# Patient Record
Sex: FEMALE | Race: WHITE | NOT HISPANIC OR LATINO | Employment: FULL TIME | URBAN - METROPOLITAN AREA
[De-identification: names, ages, dates, MRNs, and addresses within clinical notes are randomized per-mention and may not be internally consistent; named-entity substitution may affect disease eponyms.]

---

## 2017-12-12 ENCOUNTER — HOSPITAL ENCOUNTER (EMERGENCY)
Facility: OTHER | Age: 27
Discharge: HOME | End: 2017-12-12
Attending: EMERGENCY MEDICINE
Payer: COMMERCIAL

## 2017-12-12 VITALS
WEIGHT: 145 LBS | HEART RATE: 113 BPM | BODY MASS INDEX: 21.48 KG/M2 | DIASTOLIC BLOOD PRESSURE: 79 MMHG | SYSTOLIC BLOOD PRESSURE: 143 MMHG | HEIGHT: 69 IN | TEMPERATURE: 98 F | OXYGEN SATURATION: 100 % | RESPIRATION RATE: 17 BRPM

## 2017-12-12 DIAGNOSIS — J02.9 PHARYNGITIS, UNSPECIFIED ETIOLOGY: Primary | ICD-10-CM

## 2017-12-12 LAB
B-HCG UR QL: NEGATIVE
CTP QC/QA: YES

## 2017-12-12 PROCEDURE — 63600175 PHARM REV CODE 636 W HCPCS: Performed by: EMERGENCY MEDICINE

## 2017-12-12 PROCEDURE — 99283 EMERGENCY DEPT VISIT LOW MDM: CPT | Mod: 25

## 2017-12-12 PROCEDURE — 96372 THER/PROPH/DIAG INJ SC/IM: CPT

## 2017-12-12 PROCEDURE — 81025 URINE PREGNANCY TEST: CPT | Performed by: EMERGENCY MEDICINE

## 2017-12-12 RX ORDER — DEXAMETHASONE SODIUM PHOSPHATE 4 MG/ML
8 INJECTION, SOLUTION INTRA-ARTICULAR; INTRALESIONAL; INTRAMUSCULAR; INTRAVENOUS; SOFT TISSUE
Status: COMPLETED | OUTPATIENT
Start: 2017-12-12 | End: 2017-12-12

## 2017-12-12 RX ORDER — AZITHROMYCIN 250 MG/1
250 TABLET, FILM COATED ORAL DAILY
Qty: 6 TABLET | Refills: 0 | Status: SHIPPED | OUTPATIENT
Start: 2017-12-12

## 2017-12-12 RX ADMIN — DEXAMETHASONE SODIUM PHOSPHATE 8 MG: 4 INJECTION, SOLUTION INTRAMUSCULAR; INTRAVENOUS at 05:12

## 2017-12-12 NOTE — ED TRIAGE NOTES
Pt reports sore throat for 1 1/2 with last fever last Thursday. Reports throat is not getting any better. Pt able to still eat and drink but painful. Pt states she has ulcers in mouth. Pt reports nausea and fatigue.

## 2017-12-12 NOTE — ED PROVIDER NOTES
Encounter Date: 2017    SCRIBE #1 NOTE: I, Magy Sahu , am scribing for, and in the presence of, Dr. Anne.       History     Chief Complaint   Patient presents with    Sore Throat     x 1 week, seen at [facility] and diagnosed with strep, started on 10-day amoxicillin regimen; tested for mono and flu at urgent care on wednesday given steroid shot with some relief from sore throat; pt reports swelling and tenderness to lymph nodes in neck have increased x 2 days      Time seen by provider: 5:01 PM    This is a 27 y.o. female who presents with complaint of sore throat that began ten days ago. The patient was diagnosed with strep throat and started on a 10-day course of amoxicillan (BID) when seen in [facility] clinic. She has five doses of antibiotics left. The patient received a steroid injection and negative flu swab result when seen in Urgent Care six days ago. The patient reports fever (resolved), sinus pressure, and ulcers that are located in the mouth, but denies congestion, rhinorrhea, ear pain, nausea, vomiting, abdominal pain, cough, SOB, or myalgias. She has not noticed any improvement after use of antibiotics, but experienced a little relief with use of mouth wash for ulcers.       The history is provided by the patient.     Review of patient's allergies indicates:  No Known Allergies  No past medical history on file.  No past surgical history on file.  Family History   Problem Relation Age of Onset    Breast cancer Neg Hx     Cancer Neg Hx     Colon cancer Neg Hx     Diabetes Neg Hx     Eclampsia Neg Hx     Hypertension Neg Hx     Miscarriages / Stillbirths Neg Hx     Ovarian cancer Neg Hx      labor Neg Hx     Stroke Neg Hx      Social History   Substance Use Topics    Smoking status: Never Smoker    Smokeless tobacco: Not on file    Alcohol use Yes     Review of Systems   Constitutional: Positive for fever (resolved). Negative for activity change, appetite change, chills and  diaphoresis.   HENT: Positive for sinus pressure and sore throat. Negative for congestion, ear pain, rhinorrhea and trouble swallowing.         Positive for ulcers.   Eyes: Negative for photophobia and visual disturbance.   Respiratory: Negative for cough, chest tightness and shortness of breath.    Cardiovascular: Negative for chest pain.   Gastrointestinal: Negative for abdominal pain, nausea and vomiting.   Endocrine: Negative for polydipsia and polyuria.   Genitourinary: Negative for difficulty urinating and flank pain.   Musculoskeletal: Negative for back pain, myalgias and neck pain.   Skin: Negative for rash.   Neurological: Negative for weakness and headaches.   Psychiatric/Behavioral: Negative for confusion.       Physical Exam     Initial Vitals [12/12/17 1649]   BP Pulse Resp Temp SpO2   134/88 103 16 97.4 °F (36.3 °C) 100 %      MAP       103.33         Physical Exam    Nursing note and vitals reviewed.  Constitutional: She appears well-developed and well-nourished. She is not diaphoretic. No distress.   HENT:   Head: Normocephalic and atraumatic.   Right Ear: External ear normal.   Left Ear: External ear normal. Tympanic membrane is not erythematous.  No middle ear effusion.   Bilateral tonsillar edema and erythema (left greater than right). No sublingual elevation. No soft palate swelling. Right tympanic membrane: Obscured by cerumen.    Eyes: EOM are normal. Pupils are equal, round, and reactive to light. Right eye exhibits no discharge. Left eye exhibits no discharge.   Neck: Normal range of motion.   Bilateral submental lymphadenopathy.   Cardiovascular: Normal rate, regular rhythm and normal heart sounds. Exam reveals no gallop and no friction rub.    No murmur heard.  Pulmonary/Chest: Breath sounds normal. No stridor. No respiratory distress. She has no wheezes. She has no rhonchi. She has no rales.   Abdominal: Soft. There is no tenderness. There is no rebound and no guarding.   Musculoskeletal:  Normal range of motion. She exhibits no edema or tenderness.   Lymphadenopathy:     She has cervical adenopathy (bilateral).   Neurological: She is alert and oriented to person, place, and time.   Skin: Skin is warm and dry. No rash and no abscess noted. No erythema. No pallor.   Psychiatric: She has a normal mood and affect. Her behavior is normal. Judgment and thought content normal.         ED Course   Procedures  Labs Reviewed   POCT URINE PREGNANCY             Medical Decision Making:   Clinical Tests:   Lab Tests: Ordered and Reviewed  ED Management:  Well-appearing patient presents with persistent pharyngitis greater than 1 week.  She has been taking amoxicillin, and received a steroid shot.  Initial improvements steroid shot, but feels symptoms have gotten worse.  No difficulties breathing.  Painful swallowing, no difficulty tolerating solids or liquids.  Is also concerned about increasing lymphadenopathy.  Recently noticed also tender mouth.  Also to be most consistent with a viral stomatitis.  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  She does have tonsillar edema, left greater than right, it is not taut, and has no appearance of PTA.  There is no uvular deviation.  She is having no stridor.  I've given her another steroid shot, switch her antibiotics, and encouraged her to stay hydrated and follow-up with primary care.  She is return here with any worsening of her symptoms.    I did have an extensive talk regarding signs to return for and need for follow up. Patient expressed understanding and will monitor symptoms closely and follow-up as needed.    CLAYTON Anne M.D.  12/12/2017  6:09 PM              Scribe Attestation:   Scribe #1: I performed the above scribed service and the documentation accurately describes the services I performed. I attest to the accuracy of the note.    Attending Attestation:           Physician Attestation for Scribe:  Physician  Attestation Statement for Scribe #1: I, Dr. Anne, reviewed documentation, as scribed by Magy Sahu  in my presence, and it is both accurate and complete.                 ED Course      Clinical Impression:     1. Pharyngitis, unspecified etiology                               Gomez Anne MD  12/12/17 8901

## 2018-03-04 ENCOUNTER — OFFICE VISIT (OUTPATIENT)
Dept: URGENT CARE | Facility: CLINIC | Age: 28
End: 2018-03-04
Payer: COMMERCIAL

## 2018-03-04 VITALS
BODY MASS INDEX: 20.29 KG/M2 | HEART RATE: 100 BPM | RESPIRATION RATE: 20 BRPM | TEMPERATURE: 99 F | SYSTOLIC BLOOD PRESSURE: 139 MMHG | DIASTOLIC BLOOD PRESSURE: 92 MMHG | OXYGEN SATURATION: 99 % | WEIGHT: 137 LBS | HEIGHT: 69 IN

## 2018-03-04 DIAGNOSIS — K12.0 CANKER SORE: Primary | ICD-10-CM

## 2018-03-04 PROCEDURE — 99203 OFFICE O/P NEW LOW 30 MIN: CPT | Mod: S$GLB,,, | Performed by: NURSE PRACTITIONER

## 2018-03-04 RX ORDER — NORGESTREL AND ETHINYL ESTRADIOL 0.3-0.03MG
KIT ORAL
COMMUNITY
Start: 2018-01-11

## 2018-03-04 RX ORDER — TRIAMCINOLONE ACETONIDE 1 MG/G
PASTE DENTAL 2 TIMES DAILY
Qty: 5 G | Refills: 2 | Status: SHIPPED | OUTPATIENT
Start: 2018-03-04 | End: 2018-04-03

## 2018-03-04 NOTE — PATIENT INSTRUCTIONS
Stay hydrated with increased water intake.  Avoid acidic foods.  Magic Mouthwash as directed.  Kenalog cream as directed.  Follow up with PCP.  Canker Sore    A canker sore (also called an aphthous ulcer) is a painful sore on the lining of the mouth. It is most painful during the first few days, and it lasts about 7 to 14 days before going away.  Causes  Canker sores are not cold sores or fever blisters. They are not contagious, so they are not spread by contact. The exact cause of canker sores is not clear, but there are a number of things that can trigger them in different people.  · Mild injury, such as biting the inside of the mouth, lip, or cheek, or dental procedures  · Stress  · Poor diet, or lack of certain nutrients, including B vitamins and iron  · Foods that can irritate the mouth, including tomatoes, citrus fruits, and some nuts (foods that are acidic or contain bitter substances called tannins)  · Irritating chemicals, such as those in some toothpastes and mouthwashes  · Certain chronic illnesses  Symptoms  Canker sores are found on the lining of the mouth. They can be inside the cheeks or lips, on the roof of the mouth, at the base of the gums, on the tongue, or in the back of the throat. Canker sores typically have these characteristics:  · Small, flat (not raised) sores  · Can be white or yellowish bumps that are red around the edges or have a red halo  · Usually small in size, roundish, and in groups  · Accompanied by pain or burning  Canker sores do not leave a scar. But they usually come back.  Home care  The goals of canker sore treatment are to decrease the pain, speed healing, and prevent recurrence. No single treatment works for everyone. Try a number of techniques to see what works best.  General care  · You may find that soft, easy-to-chew foods cause less pain. Use a straw to direct liquids away from the sore.  · Use a soft-bristle toothbrush, and brush your teeth gently.  · Avoid acidic,  salty, or spicy foods.  · Avoid injuring the inside of your mouth, or scraping your existing canker sores, by avoiding crusty and crunchy foods like french bread and chips.  Medicines  You can try over-the-counter medicines that cover the sores and numb them. This protects the sores while they heal and helps reduce pain.  Homemade rinses and solutions  You can use these solutions as mouth rinses. Spit them out after using them. You can also dab them on the sores. You can repeat these treatments as often as needed.  · Rinse your mouth with saltwater.  · Mix equal amounts of hydrogen peroxide and water. You can use it as a mouthwash or dab it on spots with a cotton swab. You can also add sodium bicarbonate to this to make a paste, and then dab it on spots.  Follow-up care  Follow up with your healthcare provider, or as advised.  · If a culture was done, you will be notified if the treatment needs to be changed. You can call as directed for the results.  Call 911  Contact emergency services if any of these occur:  · Trouble breathing  · Inability to swallow  · Extreme drowsiness or trouble awakening  · Fainting or loss of consciousness  · Rapid heart rate  · Seizure  · Stiff neck  When to seek medical advice  Call your healthcare provider right away if any of these occur:  · You have a fever of 100.4°F (38°C) or higher.  · You are pregnant.  · You just had surgery or another medical procedure, or you were just discharged from the hospital.  · You are unable to eat or swallow due to pain.  Date Last Reviewed: 7/30/2015  © 1824-2544 [employer]. [address] [place] [address], [place] [address]. All rights reserved. This information is not intended as a substitute for professional medical care. Always follow your healthcare professional's instructions.

## 2018-03-04 NOTE — PROGRESS NOTES
"Subjective:       Patient ID: Christina Willson is a 27 y.o. female.    Vitals:  height is 5' 9" (1.753 m) and weight is 62.1 kg (137 lb). Her oral temperature is 99.4 °F (37.4 °C). Her blood pressure is 139/92 (abnormal) and her pulse is 100. Her respiration is 20 and oxygen saturation is 99%.     Chief Complaint: Oral Pain    C/o canker sore to left inner lip x7-8 days.  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        Oral Pain    This is a new problem. The current episode started in the past 7 days. The problem occurs constantly. The problem has been gradually worsening. The pain is at a severity of 10/10. The pain is severe (When food are water touch its a 10 without patient states its a 5). Pertinent negatives include no difficulty swallowing, facial pain, fever, oral bleeding, sinus pressure or thermal sensitivity. She has tried nothing for the symptoms. The treatment provided no relief.     Review of Systems   Constitution: Negative for chills and fever.   HENT: Negative for sinus pressure and sore throat.    Eyes: Negative for blurred vision.   Cardiovascular: Negative for chest pain.   Respiratory: Negative for shortness of breath.    Skin: Negative for rash.   Musculoskeletal: Negative for back pain and joint pain.   Gastrointestinal: Negative for abdominal pain, diarrhea, nausea and vomiting.   Neurological: Negative for headaches.   Psychiatric/Behavioral: The patient is not nervous/anxious.        Objective:      Physical Exam   Constitutional: She is oriented to person, place, and time. She appears well-developed and well-nourished.   HENT:   Head: Normocephalic and atraumatic. Head is without abrasion, without contusion and without laceration.   Right Ear: Hearing, tympanic membrane, external ear and ear canal normal.   Left Ear: Hearing, tympanic membrane, external ear and ear canal normal.   Nose: Nose normal. No mucosal edema, rhinorrhea or sinus tenderness. Right sinus " exhibits no maxillary sinus tenderness and no frontal sinus tenderness. Left sinus exhibits no maxillary sinus tenderness and no frontal sinus tenderness.   Mouth/Throat: Uvula is midline and oropharynx is clear and moist. Oral lesions present. No oropharyngeal exudate, posterior oropharyngeal edema or posterior oropharyngeal erythema.       Eyes: Conjunctivae, EOM and lids are normal. Pupils are equal, round, and reactive to light.   Neck: Trachea normal, full passive range of motion without pain and phonation normal. Neck supple.   Cardiovascular: Normal rate, regular rhythm and normal heart sounds.    Pulmonary/Chest: Effort normal and breath sounds normal. No stridor. No respiratory distress.   Musculoskeletal: Normal range of motion.   Neurological: She is alert and oriented to person, place, and time.   Skin: Skin is warm, dry and intact. Capillary refill takes less than 2 seconds. No abrasion, no bruising, no burn, no ecchymosis, no laceration, no lesion and no rash noted. No erythema.   Psychiatric: She has a normal mood and affect. Her speech is normal and behavior is normal. Judgment and thought content normal. Cognition and memory are normal.   Nursing note and vitals reviewed.      Assessment:       1. Canker sore        Plan:         Canker sore  -     triamcinolone acetonide 0.1% (KENALOG) 0.1 % paste; Place onto teeth 2 (two) times daily.  Dispense: 5 g; Refill: 2  -     (Magic mouthwash) 1:1:1 Benadryl 12.5mg/5ml liq, aluminum & magnesium hydroxide-simehticone (Maalox), lidocaine viscous 2%; Swish and spit 10 mLs every 4 (four) hours as needed (sore throat).  Dispense: 180 mL; Refill: 0      Patient Instructions   Stay hydrated with increased water intake.  Avoid acidic foods.  Magic Mouthwash as directed.  Kenalog cream as directed.  Follow up with PCP.  Canker Sore    A canker sore (also called an aphthous ulcer) is a painful sore on the lining of the mouth. It is most painful during the first few  days, and it lasts about 7 to 14 days before going away.  Causes  Canker sores are not cold sores or fever blisters. They are not contagious, so they are not spread by contact. The exact cause of canker sores is not clear, but there are a number of things that can trigger them in different people.  · Mild injury, such as biting the inside of the mouth, lip, or cheek, or dental procedures  · Stress  · Poor diet, or lack of certain nutrients, including B vitamins and iron  · Foods that can irritate the mouth, including tomatoes, citrus fruits, and some nuts (foods that are acidic or contain bitter substances called tannins)  · Irritating chemicals, such as those in some toothpastes and mouthwashes  · Certain chronic illnesses  Symptoms  Canker sores are found on the lining of the mouth. They can be inside the cheeks or lips, on the roof of the mouth, at the base of the gums, on the tongue, or in the back of the throat. Canker sores typically have these characteristics:  · Small, flat (not raised) sores  · Can be white or yellowish bumps that are red around the edges or have a red halo  · Usually small in size, roundish, and in groups  · Accompanied by pain or burning  Canker sores do not leave a scar. But they usually come back.  Home care  The goals of canker sore treatment are to decrease the pain, speed healing, and prevent recurrence. No single treatment works for everyone. Try a number of techniques to see what works best.  General care  · You may find that soft, easy-to-chew foods cause less pain. Use a straw to direct liquids away from the sore.  · Use a soft-bristle toothbrush, and brush your teeth gently.  · Avoid acidic, salty, or spicy foods.  · Avoid injuring the inside of your mouth, or scraping your existing canker sores, by avoiding crusty and crunchy foods like [place] bread and chips.  Medicines  You can try over-the-counter medicines that cover the sores and numb them. This protects the sores while  they heal and helps reduce pain.  Homemade rinses and solutions  You can use these solutions as mouth rinses. Spit them out after using them. You can also dab them on the sores. You can repeat these treatments as often as needed.  · Rinse your mouth with saltwater.  · Mix equal amounts of hydrogen peroxide and water. You can use it as a mouthwash or dab it on spots with a cotton swab. You can also add sodium bicarbonate to this to make a paste, and then dab it on spots.  Follow-up care  Follow up with your healthcare provider, or as advised.  · If a culture was done, you will be notified if the treatment needs to be changed. You can call as directed for the results.  Call 911  Contact emergency services if any of these occur:  · Trouble breathing  · Inability to swallow  · Extreme drowsiness or trouble awakening  · Fainting or loss of consciousness  · Rapid heart rate  · Seizure  · Stiff neck  When to seek medical advice  Call your healthcare provider right away if any of these occur:  · You have a fever of 100.4°F (38°C) or higher.  · You are pregnant.  · You just had surgery or another medical procedure, or you were just discharged from the hospital.  · You are unable to eat or swallow due to pain.  Date Last Reviewed: 7/30/2015  © 0586-5073 [employer]. [address] [place] [address], [place] [address]. All rights reserved. This information is not intended as a substitute for professional medical care. Always follow your healthcare professional's instructions.

## 2020-03-25 ENCOUNTER — PATIENT MESSAGE (OUTPATIENT)
Dept: OBSTETRICS AND GYNECOLOGY | Facility: CLINIC | Age: 30
End: 2020-03-25

## 2020-03-27 ENCOUNTER — PATIENT MESSAGE (OUTPATIENT)
Dept: OBSTETRICS AND GYNECOLOGY | Facility: CLINIC | Age: 30
End: 2020-03-27

## 2020-03-27 ENCOUNTER — OFFICE VISIT (OUTPATIENT)
Dept: OBSTETRICS AND GYNECOLOGY | Facility: CLINIC | Age: 30
End: 2020-03-27
Payer: MEDICAID

## 2020-03-27 DIAGNOSIS — E28.2 PCOS (POLYCYSTIC OVARIAN SYNDROME): Primary | ICD-10-CM

## 2020-03-27 PROCEDURE — 99204 OFFICE O/P NEW MOD 45 MIN: CPT | Mod: 95,,, | Performed by: OBSTETRICS & GYNECOLOGY

## 2020-03-27 PROCEDURE — 99204 PR OFFICE/OUTPT VISIT, NEW, LEVL IV, 45-59 MIN: ICD-10-PCS | Mod: 95,,, | Performed by: OBSTETRICS & GYNECOLOGY

## 2020-03-27 NOTE — PROGRESS NOTES
The patient location is:  HOME  The chief complaint leading to consultation is: PCOS and contraception  Visit type: Virtual visit with synchronous audio and video  Total time spent with patient: 25 min  Each patient to whom he or she provides medical services by telemedicine is:  (1) informed of the relationship between the physician and patient and the respective role of any other health care provider with respect to management of the patient; and (2) notified that he or she may decline to receive medical services by telemedicine and may withdraw from such care at any time.      Christina Willson is a 30 y.o. female No obstetric history on file. presents for a discussion on PCOS and contraception.   She is on OCPs.    Diagnosed with  PCOS at age 17.  US at c/w PCOS.  Had irregular cycles.   She would skip cycles.   Was put on cryselle  For cycle regulation.  Is now bleeding over longer periods of time.  Patient was pre diabetic .  Was put on metformin. Stopped taking at age 22 yrs old  Lat pap was in 2017.    She would like to get a MREINA IUD      No past medical history on file.    No past surgical history on file.    OB History   No data available       Family History   Problem Relation Age of Onset    Hyperlipidemia Father     Diabetes Mother     Breast cancer Neg Hx     Cancer Neg Hx     Colon cancer Neg Hx     Eclampsia Neg Hx     Hypertension Neg Hx     Miscarriages / Stillbirths Neg Hx     Ovarian cancer Neg Hx      labor Neg Hx     Stroke Neg Hx        Social History     Tobacco Use    Smoking status: Never Smoker    Smokeless tobacco: Never Used   Substance Use Topics    Alcohol use: Yes    Drug use: No       There were no vitals taken for this visit.    ROS:  GENERAL: Denies weight gain or weight loss. Feeling well overall.   SKIN: Denies rash or lesions.   HEAD: Denies head injury or headache.   NODES: Denies enlarged lymph nodes.   CHEST: Denies chest pain or shortness of  breath.   CARDIOVASCULAR: Denies palpitations or left sided chest pain.   ABDOMEN: No abdominal pain, constipation, diarrhea, nausea, vomiting or rectal bleeding.   URINARY: No frequency, dysuria, hematuria, or burning on urination.  REPRODUCTIVE: See HPI.   BREASTS: The patient performs breast self-examination and denies pain, lumps, or nipple discharge.   HEMATOLOGIC: No easy bruisability or excessive bleeding.  MUSCULOSKELETAL: Denies joint pain or swelling.   NEUROLOGIC: Denies syncope or weakness.   PSYCHIATRIC: Denies depression, anxiety or mood swings.    Physical Exam:    APPEARANCE: Well nourished, well developed, in no acute distress.  AFFECT: WNL, alert and oriented x 3  SKIN: No acne or hirsutism      ASSESSMENT AND PLAN  1. PCOS (polycystic ovarian syndrome)  Device Authorization Order       Discussed birth control options- possible family planning, macario method, [employer] OPTIONS,  OCPs, combination vs progesterone only pill,  NUVA RING, ORTHO EVRA RING, NEXPLANON   But here is increased risks of elevated BP with methods containing estrogen.  IUDS-   Possible MIRENA IUD, , TERRA and Liletta/  Kyleena.   Paragard IUD.   Condoms and barrier methods.    Discussed PCOS center.  Discussed the team of providers who work with the patient on GYN concerns,  Cycle control,  Fertility management,  Ovarian health and possible hormonal treatment options.  Endocrinology provider who manages possible insulin resistance and pre diabetic concerns, as well as the hormonal concerns.  Medi weight loss team for coaching, weight loss strategies, supplements and nutritional counseling.  Walk and talk therapy- possible behavorial modifications, possible increased risks for anxiety and depression  Dermatology referrals for possible skin conditions, acne and hirsutism      Will order the MIRENA IUD and do pap at that time  Spent 25 min  In

## 2020-04-20 ENCOUNTER — PATIENT MESSAGE (OUTPATIENT)
Dept: OBSTETRICS AND GYNECOLOGY | Facility: CLINIC | Age: 30
End: 2020-04-20

## 2020-04-27 ENCOUNTER — TELEPHONE (OUTPATIENT)
Dept: OBSTETRICS AND GYNECOLOGY | Facility: CLINIC | Age: 30
End: 2020-04-27

## 2020-04-27 NOTE — TELEPHONE ENCOUNTER
----- Message from Ade Morrison sent at 4/27/2020 12:00 PM CDT -----  Contact: LISA ROBINS [[id number]]  Name of Who is Calling: LISA ROBINS [[id number]]      What is the request in detail: patient would like to speak with staff regarding birth control. Please call      Can the clinic reply by [facility]: no      What Number [facility] Back [facility]: [phone] (home)

## 2020-04-28 ENCOUNTER — PATIENT MESSAGE (OUTPATIENT)
Dept: OBSTETRICS AND GYNECOLOGY | Facility: CLINIC | Age: 30
End: 2020-04-28

## 2020-04-28 ENCOUNTER — PROCEDURE VISIT (OUTPATIENT)
Dept: OBSTETRICS AND GYNECOLOGY | Facility: CLINIC | Age: 30
End: 2020-04-28
Payer: MEDICAID

## 2020-04-28 VITALS
SYSTOLIC BLOOD PRESSURE: 116 MMHG | BODY MASS INDEX: 19.56 KG/M2 | HEIGHT: 69 IN | WEIGHT: 132.06 LBS | DIASTOLIC BLOOD PRESSURE: 72 MMHG

## 2020-04-28 DIAGNOSIS — Z30.9 ENCOUNTER FOR CONTRACEPTIVE MANAGEMENT, UNSPECIFIED TYPE: Primary | ICD-10-CM

## 2020-04-28 LAB
B-HCG UR QL: NEGATIVE
CTP QC/QA: YES

## 2020-04-28 PROCEDURE — 58300 INSERT INTRAUTERINE DEVICE: CPT | Mod: PBBFAC | Performed by: OBSTETRICS & GYNECOLOGY

## 2020-04-28 PROCEDURE — 58300 INSERTION OF IUD-TODAY: ICD-10-PCS | Mod: S$PBB,,, | Performed by: OBSTETRICS & GYNECOLOGY

## 2020-04-28 PROCEDURE — 81025 URINE PREGNANCY TEST: CPT | Mod: PBBFAC | Performed by: OBSTETRICS & GYNECOLOGY

## 2020-04-28 PROCEDURE — 88175 CYTOPATH C/V AUTO FLUID REDO: CPT

## 2020-04-28 RX ORDER — DEXTROAMPHETAMINE SACCHARATE, AMPHETAMINE ASPARTATE, DEXTROAMPHETAMINE SULFATE AND AMPHETAMINE SULFATE 5; 5; 5; 5 MG/1; MG/1; MG/1; MG/1
20 TABLET ORAL
COMMUNITY
Start: 2020-04-01 | End: 2021-04-01

## 2020-04-28 RX ORDER — VENLAFAXINE HYDROCHLORIDE 37.5 MG/1
CAPSULE, EXTENDED RELEASE ORAL
COMMUNITY
Start: 2020-02-27

## 2020-04-28 RX ORDER — LEVONORGESTREL AND ETHINYL ESTRADIOL 0.1-0.02MG
1 KIT ORAL DAILY
COMMUNITY
Start: 2020-03-28

## 2020-04-28 RX ORDER — DEXTROAMPHETAMINE SACCHARATE, AMPHETAMINE ASPARTATE, DEXTROAMPHETAMINE SULFATE AND AMPHETAMINE SULFATE 5; 5; 5; 5 MG/1; MG/1; MG/1; MG/1
1 TABLET ORAL 2 TIMES DAILY
COMMUNITY
Start: 2020-04-02

## 2020-04-28 RX ADMIN — LEVONORGESTREL 1 INTRA UTERINE DEVICE: 52 INTRAUTERINE DEVICE INTRAUTERINE at 01:04

## 2020-04-28 NOTE — PROCEDURES
Insertion of IUD-Today  Date/Time: 4/28/2020 1:00 PM  Performed by: Marychuy Dumont MD  Authorized by: Marychuy Dumont MD     Consent:     Consent obtained:  Written    Consent given by:  Patient    Procedure risks and benefits discussed: yes      Patient agrees, verbalizes understanding, and wants to proceed: yes      Educational handouts given: yes      Instructions and paperwork completed: yes    Procedure:     Pelvic exam performed: yes      Negative GC/chlamydia test: no      Negative urine pregnancy test: yes      Negative serum pregnancy test: no      Cervix cleaned and prepped: yes      Speculum placed in vagina: yes      Tenaculum applied to cervix: yes      Uterus sounded: yes      Uterus sound depth (cm):  8    IUD inserted with no complications: yes      Strings trimmed: yes    Post-procedure:     Patient tolerated procedure well: yes      Patient will follow up after next period: yes

## 2020-04-28 NOTE — PROCEDURES
Procedures     The patient also describes dyspareunia.  She feels stress makes it worse. She tolerated the IUD insertion well. No severe cervical or uterine pain.  Will refer to Dr Corado for sex therapy  [place]parise

## 2020-04-30 LAB
FINAL PATHOLOGIC DIAGNOSIS: NORMAL
Lab: NORMAL

## 2021-04-26 ENCOUNTER — PATIENT MESSAGE (OUTPATIENT)
Dept: RESEARCH | Facility: HOSPITAL | Age: 31
End: 2021-04-26

## 2023-07-21 ENCOUNTER — TELEPHONE (OUTPATIENT)
Dept: OBSTETRICS AND GYNECOLOGY | Facility: CLINIC | Age: 33
End: 2023-07-21
Payer: MEDICAID

## 2023-07-21 NOTE — TELEPHONE ENCOUNTER
----- Message from Halina Sarmiento sent at 7/20/2023  5:48 PM CDT -----  Type:  Sooner Apoointment Request    Caller is requesting a sooner appointment.  Caller declined first available appointment listed below.  Caller will not accept being placed on the waitlist and is requesting a message be sent to doctor.  Name of Caller: Pt  When is the first available appointment? September 2023  Would the patient rather a call back or a response via [facility]? Call  Best Call Back Number: [phone]  Additional Information:

## 2024-06-10 ENCOUNTER — OFFICE VISIT (OUTPATIENT)
Dept: OBSTETRICS AND GYNECOLOGY | Facility: CLINIC | Age: 34
End: 2024-06-10
Payer: COMMERCIAL

## 2024-06-10 VITALS
HEIGHT: 69 IN | SYSTOLIC BLOOD PRESSURE: 92 MMHG | BODY MASS INDEX: 19.92 KG/M2 | WEIGHT: 134.5 LBS | DIASTOLIC BLOOD PRESSURE: 62 MMHG

## 2024-06-10 DIAGNOSIS — Z11.51 SCREENING FOR HPV (HUMAN PAPILLOMAVIRUS): ICD-10-CM

## 2024-06-10 DIAGNOSIS — Z12.4 SCREENING FOR CERVICAL CANCER: ICD-10-CM

## 2024-06-10 DIAGNOSIS — Z01.419 ENCOUNTER FOR GYNECOLOGICAL EXAMINATION: Primary | ICD-10-CM

## 2024-06-10 DIAGNOSIS — Z12.31 SCREENING MAMMOGRAM FOR BREAST CANCER: ICD-10-CM

## 2024-06-10 PROCEDURE — 3074F SYST BP LT 130 MM HG: CPT | Mod: CPTII,S$GLB,, | Performed by: OBSTETRICS & GYNECOLOGY

## 2024-06-10 PROCEDURE — 1159F MED LIST DOCD IN RCRD: CPT | Mod: CPTII,S$GLB,, | Performed by: OBSTETRICS & GYNECOLOGY

## 2024-06-10 PROCEDURE — 99385 PREV VISIT NEW AGE 18-39: CPT | Mod: S$GLB,,, | Performed by: OBSTETRICS & GYNECOLOGY

## 2024-06-10 PROCEDURE — 3078F DIAST BP <80 MM HG: CPT | Mod: CPTII,S$GLB,, | Performed by: OBSTETRICS & GYNECOLOGY

## 2024-06-10 PROCEDURE — 87624 HPV HI-RISK TYP POOLED RSLT: CPT | Performed by: OBSTETRICS & GYNECOLOGY

## 2024-06-10 PROCEDURE — 99459 PELVIC EXAMINATION: CPT | Mod: S$GLB,,, | Performed by: OBSTETRICS & GYNECOLOGY

## 2024-06-10 PROCEDURE — 3008F BODY MASS INDEX DOCD: CPT | Mod: CPTII,S$GLB,, | Performed by: OBSTETRICS & GYNECOLOGY

## 2024-06-10 PROCEDURE — 99999 PR PBB SHADOW E&M-EST. PATIENT-LVL III: CPT | Mod: PBBFAC,,, | Performed by: OBSTETRICS & GYNECOLOGY

## 2024-06-10 PROCEDURE — 1160F RVW MEDS BY RX/DR IN RCRD: CPT | Mod: CPTII,S$GLB,, | Performed by: OBSTETRICS & GYNECOLOGY

## 2024-06-10 PROCEDURE — 88175 CYTOPATH C/V AUTO FLUID REDO: CPT | Performed by: OBSTETRICS & GYNECOLOGY

## 2024-06-10 RX ORDER — SERTRALINE HYDROCHLORIDE 100 MG/1
100 TABLET, FILM COATED ORAL
COMMUNITY
Start: 2024-05-09 | End: 2024-08-07

## 2024-06-10 RX ORDER — NALTREXONE HYDROCHLORIDE 50 MG/1
50 TABLET, FILM COATED ORAL
COMMUNITY

## 2024-06-10 RX ORDER — TRAZODONE HYDROCHLORIDE 50 MG/1
50 TABLET ORAL
COMMUNITY
Start: 2024-05-09 | End: 2024-08-07

## 2024-06-10 RX ORDER — ATOMOXETINE 40 MG/1
CAPSULE ORAL
COMMUNITY
Start: 2024-05-22

## 2024-06-10 NOTE — PROGRESS NOTES
Subjective:       Patient ID: Christina Willson is a 34 y.o. female.    Chief Complaint:  Annual Exam (Last pap 2020 normal/No previous HPV//)      History of Present Illness  - here for annual. No complaints. Mirena placed 2020; no issues. Has only old blood/spotting.    History reviewed. No pertinent past medical history.    History reviewed. No pertinent surgical history.     Family History   Problem Relation Name Age of Onset    Hyperlipidemia Father      Diabetes Mother      Breast cancer Neg Hx      Cancer Neg Hx      Colon cancer Neg Hx      Eclampsia Neg Hx      Hypertension Neg Hx      Miscarriages / Stillbirths Neg Hx      Ovarian cancer Neg Hx       labor Neg Hx      Stroke Neg Hx          Social History     Socioeconomic History    Marital status: Single   Tobacco Use    Smoking status: Never    Smokeless tobacco: Never   Substance and Sexual Activity    Alcohol use: Yes    Drug use: No    Sexual activity: Yes     Partners: Male     Birth control/protection: I.U.D.     Social Determinants of Health     Financial Resource Strain: Low Risk  (2024)    Received from [facility]    Overall Financial Resource Strain (CARDIA)     Difficulty of Paying Living Expenses: Not hard at all   Food Insecurity: No Food Insecurity (2024)    Received from  [facility]    Hunger Vital Sign     Worried About Running Out of Food in the Last Year: Never true     Ran Out of Food in the Last Year: Never true   Transportation Needs: No Transportation Needs (2024)    Received from  [facility]    PRAPARE - Transportation     Lack of Transportation (Medical): No     Lack of Transportation (Non-Medical): No   Physical Activity: Inactive (2024)    Received from  [facility]    Exercise Vital Sign     Days of Exercise per Week: 0 days     Minutes of Exercise per Session: 0 min   Stress: Stress Concern Present (2024)    Received from [facility]    [employer] - Occupational Stress  "Questionnaire     Feeling of Stress : Very much           Objective:     Vitals:    06/10/24 0950   BP: 92/62   BP Location: Right arm   Patient Position: Sitting   Weight: 61 kg (134 lb 7.7 oz)   Height: 5' 9" (1.753 m)       Physical Exam:   Constitutional: She is oriented to person, place, and time. She appears well-developed and well-nourished.        Pulmonary/Chest: Right breast exhibits no mass, no nipple discharge, no skin change, no tenderness and no swelling. Left breast exhibits no mass, no nipple discharge, no skin change, no tenderness and no swelling. Breasts are symmetrical.        Abdominal: Soft. She exhibits no distension. There is no abdominal tenderness.     Genitourinary:    Vagina and uterus normal.   There is no tenderness or lesion on the right labia. There is no tenderness or lesion on the left labia. Cervix is normal. Right adnexum displays no mass, no tenderness and no fullness. Left adnexum displays no mass, no tenderness and no fullness. No vaginal discharge in the vagina.    pap smear completedIUD strings visualized.          Musculoskeletal: Moves all extremeties.       Neurological: She is alert and oriented to person, place, and time.     Psychiatric: She has a normal mood and affect.      A female chaperone was present for exam.    Assessment/ Plan:          Christina was seen today for annual exam.    Diagnoses and all orders for this visit:    Encounter for gynecological examination    Screening mammogram for breast cancer    Screening for cervical cancer    Screening for HPV (human papillomavirus)        Follow up in about 1 year (around 6/10/2025) for annual exam.    As of April 1, 2021, the Cures Act has been passed nationally. This new law requires that all doctors progress notes, lab results, pathology reports and radiology reports be released IMMEDIATELY to the patient in the patient portal. That means that the results are released to you at the EXACT same time they are released " to me. Therefore, with all of the patients that I have I am not able to reply to each patient exactly when the results come in. So there will be a delay from when you see the results to when I see them and have time to come up with a response to send you. Also I only see these results when I am on the computer at work. So if the results come in over the weekend or after 5 pm of a work day, I will not see them until the next business day. 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 So please be patient!   Thanks for your understanding and patience.

## 2024-06-13 LAB
HPV HR 12 DNA SPEC QL NAA+PROBE: POSITIVE
HPV16 AG SPEC QL: NEGATIVE
HPV18 DNA SPEC QL NAA+PROBE: NEGATIVE

## 2024-06-16 LAB
FINAL PATHOLOGIC DIAGNOSIS: NORMAL
Lab: NORMAL

## 2024-06-25 ENCOUNTER — HOSPITAL ENCOUNTER (OUTPATIENT)
Dept: RADIOLOGY | Facility: HOSPITAL | Age: 34
Discharge: HOME | End: 2024-06-25
Attending: OBSTETRICS & GYNECOLOGY
Payer: COMMERCIAL

## 2024-06-25 ENCOUNTER — OCCUPATIONAL HEALTH (OUTPATIENT)
Dept: URGENT CARE | Facility: CLINIC | Age: 34
End: 2024-06-25

## 2024-06-25 VITALS — HEIGHT: 69 IN | BODY MASS INDEX: 19.85 KG/M2 | WEIGHT: 134 LBS

## 2024-06-25 DIAGNOSIS — Z02.83 ENCOUNTER FOR BLOOD-DRUG TEST: Primary | ICD-10-CM

## 2024-06-25 DIAGNOSIS — Z12.31 SCREENING MAMMOGRAM FOR BREAST CANCER: ICD-10-CM

## 2024-06-25 PROCEDURE — 77067 SCR MAMMO BI INCL CAD: CPT | Mod: TC

## 2024-06-25 NOTE — PROGRESS NOTES
Subjective:      Patient ID: Christina Willson is a 34 y.o. female.    Chief Complaint: Drug / Alcohol Assessment    Random Drug Testing   Blood Drawn left arm 06-  Tracking # [id number]    Drug / Alcohol Assessment  ROS  Objective:     Physical Exam   Assessment:      No diagnosis found.  Plan:                 No follow-ups on file.

## 2024-07-22 ENCOUNTER — OCCUPATIONAL HEALTH (OUTPATIENT)
Dept: URGENT CARE | Facility: CLINIC | Age: 34
End: 2024-07-22

## 2024-07-22 DIAGNOSIS — Z02.83 ENCOUNTER FOR DRUG SCREENING: Primary | ICD-10-CM

## 2024-08-02 ENCOUNTER — OCCUPATIONAL HEALTH (OUTPATIENT)
Dept: URGENT CARE | Facility: CLINIC | Age: 34
End: 2024-08-02

## 2024-08-02 DIAGNOSIS — Z02.83 ENCOUNTER FOR DRUG SCREENING: Primary | ICD-10-CM

## 2024-08-03 NOTE — PROGRESS NOTES
Subjective:      Patient ID: Christina Willson is a 34 y.o. female.    Chief Complaint: Drug Screen    Random drug screen  ROS  Objective:     Physical Exam   Assessment:      No diagnosis found.  Plan:                 No follow-ups on file.

## 2024-10-24 ENCOUNTER — OCCUPATIONAL HEALTH (OUTPATIENT)
Dept: URGENT CARE | Facility: CLINIC | Age: 34
End: 2024-10-24

## 2024-10-24 DIAGNOSIS — Z02.83 ENCOUNTER FOR DRUG SCREENING: Primary | ICD-10-CM

## 2025-04-25 DIAGNOSIS — F19.90 SUBSTANCE USE DISORDER: Primary | ICD-10-CM

## 2025-05-01 ENCOUNTER — OFFICE VISIT (OUTPATIENT)
Dept: PSYCHIATRY | Facility: CLINIC | Age: 35
End: 2025-05-01
Payer: COMMERCIAL

## 2025-05-01 VITALS
HEART RATE: 75 BPM | BODY MASS INDEX: 19.32 KG/M2 | SYSTOLIC BLOOD PRESSURE: 118 MMHG | DIASTOLIC BLOOD PRESSURE: 76 MMHG | WEIGHT: 130.81 LBS

## 2025-05-01 DIAGNOSIS — F10.21 ALCOHOL USE DISORDER, SEVERE, IN SUSTAINED REMISSION: ICD-10-CM

## 2025-05-01 DIAGNOSIS — F33.1 MODERATE RECURRENT MAJOR DEPRESSION: Primary | ICD-10-CM

## 2025-05-01 PROCEDURE — 3078F DIAST BP <80 MM HG: CPT | Mod: CPTII,S$GLB,, | Performed by: PSYCHIATRY & NEUROLOGY

## 2025-05-01 PROCEDURE — 90792 PSYCH DIAG EVAL W/MED SRVCS: CPT | Mod: S$GLB,,, | Performed by: PSYCHIATRY & NEUROLOGY

## 2025-05-01 PROCEDURE — 99999 PR PBB SHADOW E&M-EST. PATIENT-LVL III: CPT | Mod: PBBFAC,,, | Performed by: PSYCHIATRY & NEUROLOGY

## 2025-05-01 PROCEDURE — 3074F SYST BP LT 130 MM HG: CPT | Mod: CPTII,S$GLB,, | Performed by: PSYCHIATRY & NEUROLOGY

## 2025-05-01 RX ORDER — SERTRALINE HYDROCHLORIDE 100 MG/1
200 TABLET, FILM COATED ORAL DAILY
Qty: 60 TABLET | Refills: 2 | Status: SHIPPED | OUTPATIENT
Start: 2025-05-01 | End: 2025-07-30

## 2025-05-01 NOTE — PROGRESS NOTES
"Outpatient Psychiatry Initial Visit (MD/NP)    5/1/2025    Christina Willson, a 35 y.o. female, presenting for initial evaluation visit. Met with patient.    Reason for Encounter: Referral from [employer] [facility]  . Patient complains of   Chief Complaint   Patient presents with    Addiction Problem   .    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  Currently in good standing with [facility] .     Alcohol issues ;  Began as  , rmarried jasbir schmidt pre -residency ,  when pgy 2 resident   Estranged from family as her choice as they don't meet [facility]   Has 3 bros    Pre -res - enjoyed kristyn as last rotation, avg grades , poor scores    Matched as a Cat 1 KRISTYN despite poor prognosis  expecting SOAP. 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   Sought break from work but declined . 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 Told to be more confident .     Staying sober not a challenge . Likes sobriety w/o worrying about behavior     Has a therapist kevin MELLO  - very good   On zoloft 100 mg started at [facility] now  per Dr Fields out of [facility] via zoom ( children on lap)  who rx d lithium ( not filled)  bc it helped her .  Zoloft has helped .      PMH  PCOS  out of nowhere  about age 21-22 - stable  dx via US with tons of cysts       Past " "psych hx   Past hx ADHD  10 years prior to rehab   2024 strattera 40 mg  failed to help i  2024 wellbutrin xl 450 mg no help with ADHD   Trazodone 50 mg       Family hx   Mother "alcoholic " with wine as far as can be remembered with untreated suspected depression   Oldest bro once contemplated SI     Social HX    Caffeine - limited   Lives alone with cat in [place] city   Supportive aunt / uncle nearby   Spiritual raised [employer]   No gun access   Hobbies - limited  but miniature houses   Newly dating  josue not in medicine - good communicator           Substance Use Disorder Criteria:  Often take in larger amounts or over a longer period of time than was intended: y  Persistent desire or unsuccessful efforts to cut down or control use: y  Great deal of time spent in activities necessary to obtain substance, use, or recover from effects: y  Craving/strong desire for substance or urge to use: y  Use resulting in failure to fulfill major role obligations at home, work or school: y  Social, occupational, recreational activities decreased because of use: y  Continued use despite having persistent or recurrent social or interpersonal problems cause or exaserbated by the substance: y  Recurrent use in situations in which it is physically hazardous: y  Use despite physical or psychological problems that are likely to have been caused or exacerbated by the substance: Yes -   Tolerance: Yes -   Withdrawal: No   Severe (>=6)           Psychiatric Review Of Systems - Is patient experiencing or having changes in:  Cries easily - more so  lately   sleep: yes, can always nap ; awakens much earlier to get self going   appetite: no  weight: ht 5'9" May - 2025-- 135 ? Does not like to weigh; mom chronically overweight   energy/anergy: yes, very low   interest/pleasure/anhedonia: yes, limited attends AA as a requirement   somatic symptoms: no  libido: fair   anxiety/panic: yes, worry apprehension   guilty/hopelessness: yes "   concentration: yes, takes effort ; should read more on surgery   S.I.B.s/risky behavior: no  Irritability: no, better - was more with  therapist or group   Racing thoughts: no  Impulsive behaviors: no  Paranoia: no  AVH: no     Caraballo score  5-1-25  ---36   no suicidal ideation      Review Of Systems:     GENERAL:  No weight gain or loss  SKIN:  No rashes or lacerations  HEAD:  No headaches  EYES:  No exophthalmos, jaundice or blindness  EARS:  No dizziness, tinnitus or hearing loss  NOSE:  No changes in smell  MOUTH & THROAT:  No dyskinetic movements or obvious goiter  CHEST:  No shortness of breath, hyperventilation or cough  CARDIOVASCULAR:  No tachycardia or chest pain  ABDOMEN:  No nausea, vomiting, pain, constipation or diarrhea  URINARY:  No frequency, dysuria or sexual dysfunction  ENDOCRINE:  No polydipsia, polyuria  MUSCULOSKELETAL:  No pain or stiffness of the joints  NEUROLOGIC:  No weakness, sensory changes, seizures, confusion, memory loss, tremor or other abnormal movements    Current Evaluation:     Nutritional Screening: Considering the patient's height and weight, medications, medical history and preferences, should a referral be made to the dietitian? no    Constitutional  Vitals:  Most recent vital signs, dated less than 90 days prior to this appointment, were reviewed.    Vitals:    05/01/25 1351   BP: 118/76   Pulse: 75   Weight: 59.4 kg (130 lb 13.5 oz)        General:  unremarkable, age appropriate, casually dressed, neatly groomed     Musculoskeletal  Muscle Strength/Tone:  no dystonia, no tremor, no tic   Gait & Station:  non-ataxic     Psychiatric  Speech:  no latency; no press, spontaneous   Mood & Affect:  Pretty flat last week or 2   congruent and appropriate   Thought Process:  normal and logical, goal-directed   Associations:  intact   Thought Content:  normal, no suicidality, no homicidality, delusions, or paranoia   Insight:  has awareness of illness   Judgement: behavior is  adequate to circumstances   Orientation:  grossly intact   Memory: intact for content of interview   Language: grossly intact   Attention Span & Concentration:  able to focus   Fund of Knowledge:  intact and appropriate to age and level of education       Relevant Elements of Neurological Exam: normal gait    Functioning in Relationships:  Spouse/partner:    Peers: HAS SUPPORT of staff ; has a few mentors   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   Latest known visit with results is:   Office Visit on 06/10/2024   Component Date Value Ref Range Status    Final Pathologic Diagnosis 06/10/2024    Final                    Value:Specimen Adequacy  Satisfactory for interpretation. Endocervical component is present.    Freedom Category  Negative for intraepithelial lesion or malignancy.  Inflammation present.      Disclaimer 06/10/2024    Final                    Value:The Pap smear is a screening test that aids in the detection of cervical cancer and cancer precursors. Both false positive and false negative results can occur. The test should be used at regular intervals, and positive results should be confirmed before   definitive therapy.  This liquid based specimen is processed using the  or  Thin PrepPAP System. This specimen has been analyzed by the ThinPrep Imaging System ([employer]), an automated imaging and review system which assists the laboratory in evaluating   cells on ThinPrep PAP tests. Following automated imaging, selected fields from every slide are reviewed by a cytotechnologist and/or pathologist.     Screening was performed at [facility], [address] [place] [address].      HPV other High Risk types, PCR 06/10/2024 Positive (A)  Negative Final    HPV High Risk type 16, PCR 06/10/2024 Negative  Negative Final    HPV High Risk type 18, PCR  06/10/2024 Negative  Negative Final         Medications  Encounter Medications[1]        Assessment - Diagnosis - Goals:     Impression: new pt to me  sobriety intact but depression is worse       ICD-10-CM ICD-9-CM   1. Moderate recurrent major depression  F33.1 296.32   2. Alcohol use disorder, severe, in sustained remission  F10.21 303.93       Strengths and Liabilities: Strength: Patient accepts guidance/feedback, Strength: Patient is expressive/articulate., Strength: Patient is intelligent., Strength: Patient is motivated for change., Strength: Patient is physically healthy., Strength: Patient has positive support network., Strength: Patient has reasonable judgment.    Treatment Goals:  Specify outcomes written in observable, behavioral terms:   Drug & Alcohol: maintian sobriety     Treatment Plan/Recommendations:   Medication Management: The risks and benefits of medication were discussed with the patient. Increase zoloft to 150 mg q day x 4 days then 200 mg q day   AA/NA/CA/ACOA/Abstinence  The treatment plan and follow up plan were reviewed with the patient.      Return to Clinic: 2 weeks         [1]   Outpatient Encounter Medications as of 5/1/2025   Medication Sig Dispense Refill    atomoxetine (STRATTERA) 40 MG capsule 1 capsule in the morning Orally BID for 30 days      naltrexone (DEPADE) 50 mg tablet Take 50 mg by mouth.      sertraline (ZOLOFT) 100 MG tablet Take 100 mg by mouth.      traZODone (DESYREL) 50 MG tablet Take 50 mg by mouth.       Facility-Administered Encounter Medications as of 5/1/2025   Medication Dose Route Frequency Provider Last Rate Last Admin    levonorgestreL 20 mcg/24 hours (5 yrs) 52 mg IUD 1 Intra Uterine Device  1 Intra Uterine Device Intrauterine  Marychuy Dumont MD   1 Intra Uterine Device at 04/28/20 1300

## 2025-05-16 ENCOUNTER — OFFICE VISIT (OUTPATIENT)
Dept: PSYCHIATRY | Facility: CLINIC | Age: 35
End: 2025-05-16
Payer: COMMERCIAL

## 2025-05-16 DIAGNOSIS — F33.1 MODERATE RECURRENT MAJOR DEPRESSION: Primary | ICD-10-CM

## 2025-05-16 DIAGNOSIS — F10.21 ALCOHOL USE DISORDER, SEVERE, IN SUSTAINED REMISSION: ICD-10-CM

## 2025-05-16 PROCEDURE — 99213 OFFICE O/P EST LOW 20 MIN: CPT | Mod: S$GLB,,, | Performed by: PSYCHIATRY & NEUROLOGY

## 2025-05-16 PROCEDURE — 90833 PSYTX W PT W E/M 30 MIN: CPT | Mod: S$GLB,,, | Performed by: PSYCHIATRY & NEUROLOGY

## 2025-05-16 PROCEDURE — 99999 PR PBB SHADOW E&M-EST. PATIENT-LVL I: CPT | Mod: PBBFAC,,, | Performed by: PSYCHIATRY & NEUROLOGY

## 2025-05-16 RX ORDER — BUPROPION HYDROCHLORIDE 150 MG/1
150 TABLET ORAL DAILY
Qty: 30 TABLET | Refills: 1 | Status: SHIPPED | OUTPATIENT
Start: 2025-05-16

## 2025-05-16 NOTE — PROGRESS NOTES
"Outpatient Psychiatry Follow-Up Visit (MD/NP)    5/16/2025    Clinical Status of Patient:  Outpatient (Ambulatory)    Chief Complaint:  Christina Willson is a 35 y.o. female who presents today for follow-up of depression and anxiety.  Met with patient.      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  Currently in good standing with [facility] .      Alcohol issues ;  Began as  , rmarried jasbir schmidt pre -residency ,  when pgy 2 resident   Estranged from family as her choice as they don't meet skilled nursing   Has 3 bros     Pre -res - enjoyed kristyn as last rotation, avg grades , poor scores    Matched as a Cat 1 KRISTYN despite poor prognosis  expecting SOAP. 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   Sought break from work but declined . 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 Told to be more confident .      Staying sober not a challenge . Likes sobriety w/o worrying about behavior      Has a therapist kevin MELLO  - very good   On zoloft 100 mg started at [facility] now  per Dr Fields out of [facility] via zoom ( children on lap)  who rx d lithium ( not filled)  bc it helped her .  Zoloft has helped .      " "  Caraballo score  5-1-25  ---35   no suicidal ideation        PMH  PCOS  out of nowhere  about age 21-21 - stable  dx via US with tons of cysts         Past psych hx   Past hx ADHD  10 years prior to rehab   2024 strattera 40 mg  failed to help i  2024 wellbutrin xl 450 mg no help with ADHD   Trazodone 50 mg         Family hx   Mother "alcoholic " with wine as far as can be remembered with untreated suspected depression   Oldest bro once contemplated SI      Social HX    Caffeine - limited   Lives alone with cat in [facility] city   Supportive aunt / uncle nearby   Spiritual raised Sikh   No gun access   Hobbies - limited  but miniature houses   Newly dating  josue not in medicine - good communicator     Substance Use Disorder Criteria: pre treatment   Often take in larger amounts or over a longer period of time than was intended: y  Persistent desire or unsuccessful efforts to cut down or control use: y  Great deal of time spent in activities necessary to obtain substance, use, or recover from effects: y  Craving/strong desire for substance or urge to use: y  Use resulting in failure to fulfill major role obligations at home, work or school: y  Social, occupational, recreational activities decreased because of use: y  Continued use despite having persistent or recurrent social or interpersonal problems cause or exaserbated by the substance: y  Recurrent use in situations in which it is physically hazardous: y  Use despite physical or psychological problems that are likely to have been caused or exacerbated by the substance: Yes -   Tolerance: Yes -   Withdrawal: No   Severe (>=6)         Updated hx 5-16-25   Thinking about switch to ER med   Stuck in jus still   Very low energy   Low enthusiasm for work   Enjoys relationship with bf but fears the loss           Psychiatric Review Of Systems - Is patient experiencing or having changes in:  Cries easily - more so  lately   sleep: yes, can always nap but easier to get up " "to go to work   appetite: no  weight: ht 5'9" May - 2025-- 135 ? Does not like to weigh; mom chronically overweight   energy/anergy: yes,  still low    interest/pleasure/anhedonia: yes, limited attends AA as a requirement ; more time with new bf   somatic symptoms: no  libido: lower with increased zoloft   anxiety/panic: yes, still with worry apprehension   guilty/hopelessness: yes   concentration: yes, takes effort ; should read more on surgery   S.I.B.s/risky behavior: no  Irritability: no, better - was more with  therapist or group   Racing thoughts: no  Impulsive behaviors: no  Paranoia: no  AVH: no       Current Outpatient Medications   Medication Instructions    naltrexone (DEPADE) 50 mg, Oral    sertraline (ZOLOFT) 200 mg, Oral, Daily    traZODone (DESYREL) 50 mg, Oral        Psychotherapy:  Target symptoms: depression, anxiety   Why chosen therapy is appropriate versus another modality: relevant to diagnosis, patient responds to this modality, evidence based practice  Outcome monitoring methods: lab data  Therapeutic intervention type: insight oriented psychotherapy, supportive psychotherapy  Topics discussed/themes: work stress  The patient's response to the intervention is accepting. The patient's progress toward treatment goals is fair.   Duration of intervention: 25 minutes.    Review of Systems   Prob Pert. 1 sys, Ext. psych +2 add., Comp. 10-14 sys  PSYCHIATRIC: Pertinant items are noted in the narrative.  CONSTITUTIONAL: No weight gain or loss.   MUSCULOSKELETAL: No pain or stiffness of the joints.  NEUROLOGIC: No weakness, sensory changes, seizures, confusion, memory loss, tremor or other abnormal movements.  ENDOCRINE: No polydipsia or polyuria.  INTEGUMENTARY: No rashes or lacerations.  EYES: No exophthalmos, jaundice or blindness.  ENT: No dizziness, tinnitus or hearing loss.  RESPIRATORY: No shortness of breath.  CARDIOVASCULAR: No tachycardia or chest pain.  GASTROINTESTINAL: No nausea, " vomiting, pain, constipation or diarrhea.  GENITOURINARY: No frequency, dysuria or sexual dysfunction.  HEMATOLOGIC/LYMPHATIC: No excessive bleeding, prolonged or excessive bleeding after dental extraction/injury.  ALLERGIC/IMMUNOLOGIC: No allergic response to materials, foods or animals at this time.    Past Medical, Family and Social History: The patient's past medical, family and social history have been reviewed and updated as appropriate within the electronic medical record - see encounter notes.    Compliance: yes    Side effects: None    Risk Parameters:  Patient reports no suicidal ideation  Patient reports no homicidal ideation  Patient reports no self-injurious behavior  Patient reports no violent behavior    Exam (detailed: at least 9 elements; comprehensive: all 15 elements)   Constitutional  Vitals:  Most recent vital signs, dated less than 90 days prior to this appointment, were reviewed.   There were no vitals filed for this visit.     General:  unremarkable, age appropriate, casually dressed, neatly groomed     Musculoskeletal  Muscle Strength/Tone:  no tremor, no tic, no atrophy   Gait & Station:  non-ataxic     Psychiatric  Speech:  no latency; no press   Mood & Affect:  dysthymic  congruent and appropriate, mood-congruent   Thought Process:  normal and logical, goal-directed   Associations:  intact   Thought Content:  normal, no suicidality, no homicidality, delusions, or paranoia   Insight:  has awareness of illness   Judgement: behavior is adequate to circumstances   Orientation:  grossly intact   Memory: intact for content of interview   Language: grossly intact   Attention Span & Concentration:  able to focus   Fund of Knowledge:  intact and appropriate to age and level of education     Assessment and Diagnosis   Status/Progress: Based on the examination today, the patient's problem(s) is/are adequately but not ideally controlled.  New problems have been presented today.   Co-morbidities are  complicating management of the primary condition.  There are no active rule-out diagnoses for this patient at this time.     General Impression: 2nd visit - good insight       ICD-10-CM ICD-9-CM   1. Moderate recurrent major depression  F33.1 296.32   2. Alcohol use disorder, severe, in sustained remission  F10.21 303.93       Intervention/Counseling/Treatment Plan   Medication Management: Continue current medications. The risks and benefits of medication were discussed with the patient.  Add wellbutrin xl to regimen  Discussed seizure risk - o contraindications of sz, eating disorder or brain trauma      Return to Clinic: 1 month

## 2025-06-25 ENCOUNTER — OFFICE VISIT (OUTPATIENT)
Dept: PSYCHIATRY | Facility: CLINIC | Age: 35
End: 2025-06-25
Payer: COMMERCIAL

## 2025-06-25 DIAGNOSIS — F10.21 ALCOHOL USE DISORDER, SEVERE, IN SUSTAINED REMISSION: ICD-10-CM

## 2025-06-25 DIAGNOSIS — F33.1 MODERATE RECURRENT MAJOR DEPRESSION: Primary | ICD-10-CM

## 2025-06-25 RX ORDER — FLUOXETINE 20 MG/1
20 CAPSULE ORAL DAILY
Qty: 30 CAPSULE | Refills: 2 | Status: SHIPPED | OUTPATIENT
Start: 2025-06-25 | End: 2026-06-25

## 2025-06-25 NOTE — PROGRESS NOTES
"Outpatient Psychiatry Follow-Up Visit (MD/NP)    6/25/2025    Clinical Status of Patient:  Outpatient (Ambulatory)    Chief Complaint:  Christina Willson is a 35 y.o. female who presents today for follow-up of depression and anxiety.  Met with patient.      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  Currently in good standing with [facility] .      Alcohol issues ;  Began as  , rmarried jasbir schmidt pre -residency ,  when pgy 2 resident   Estranged from family as her choice as they don't meet prison   Has 3 bros     Pre -res - enjoyed [facility] as last rotation, avg grades , poor scores    Matched as a Cat 1 KRISTYN despite poor prognosis  expecting SOAP. 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   Sought break from work but declined . On 1-19-24 bought bottle of gin and drank quicker than expected then called out several peers who reported her but she already self reported to faculty      Feb - May 2024   [facility] rehab in [facility] ( 110 days) ; admit PETH score >300  Dx alcohol use disorder ;  Major depression  recurrent ;  Generalized anxiety ;  stimulant abuse as exceeded doses in 2 months prior for energy      In July 2025 , expecting to repeat past year as PGY 3 responsibility level . Told to be more confident .      Staying sober not a challenge . Likes sobriety w/o worrying about behavior      Has a therapist kevin ROCHAW  - very good   On zoloft 100 mg started at [facility] now  per Dr Fields out of [facility] via zoom ( children on lap)  who rx d lithium ( not filled)  bc it helped her .  Zoloft has helped .          " "Caraballo score  5-1-25  ---36   no suicidal ideation        PMH  [facility]  out of [facility]  about age 21-22 - stable  dx via US with tons of cysts         Past psych hx   Past hx ADHD  10 years prior to rehab - based on clinical interview   2024 strattera 40 mg  failed to help i  2024 wellbutrin xl 450 mg no help with ADHD   Trazodone 50 mg         Family hx   Mother "alcoholic " with wine as far as can be remembered with untreated suspected depression   Oldest bro once contemplated SI      Social HX    Caffeine - limited   Lives alone with cat in [place] city   Supportive aunt / uncle nearby   Spiritual raised Protestant   No gun access   Hobbies - limited  but miniature houses   Newly dating  josue not in medicine - good communicator     Substance Use Disorder Criteria: pre treatment   Often take in larger amounts or over a longer period of time than was intended: y  Persistent desire or unsuccessful efforts to cut down or control use: y  Great deal of time spent in activities necessary to obtain substance, use, or recover from effects: y  Craving/strong desire for substance or urge to use: y  Use resulting in failure to fulfill major role obligations at home, work or school: y  Social, occupational, recreational activities decreased because of use: y  Continued use despite having persistent or recurrent social or interpersonal problems cause or exaserbated by the substance: y  Recurrent use in situations in which it is physically hazardous: y  Use despite physical or psychological problems that are likely to have been caused or exacerbated by the substance: Yes -   Tolerance: Yes -   Withdrawal: No   Severe (>=6)         Updated hx 5-16-25   Thinking about switch to ER med   Stuck in jus still   Very low energy   Low enthusiasm for work   Enjoys relationship with bf but fears the loss       Updated hx 6-25-25   2 weeks at ER [facility]   States [facility]  EM dir not responding - gave permission to  discuss her hx in PHP   As of now to " "repeat PGY 3 year in surgery for next 6 months to make up lost time with illness   Frustrated about the process   Zoloft had been started in rehab  ? Help     Wondering about ADD/  testing  w/ elo post  has 2 #s  -   Hard to start on new projects     Still sees therapist    Wondering about urgent care career       Psychiatric Review Of Systems - Is patient experiencing or having changes in:  Cries easily - with discussions about career   sleep: yes, melatonin helps stay asleep   appetite: no  weight: ht 5'9" May - 2025-- 135 ? Does not like to weigh; mom chronically overweight   energy/anergy: yes,  still low    interest/pleasure/anhedonia: yes, limited attends AA as a requirement ; time with new bf   somatic symptoms: no  libido: lower with increased zoloft   anxiety/panic: yes, still with worry apprehension   guilty/hopelessness: yes   concentration: yes, takes effort ; should read more on surgery   S.I.B.s/risky behavior: no  Irritability: no, better - was more with  therapist or group   Racing thoughts: no  Impulsive behaviors: no  Paranoia: no  AVH: no       Meds :  melatonin     Past meds :  Zoloft   Wellbutrin xl makes "itchy"     Current Outpatient Medications   Medication Instructions    buPROPion (WELLBUTRIN XL) 150 mg, Oral, Daily    naltrexone (DEPADE) 50 mg, Oral    sertraline (ZOLOFT) 200 mg, Oral, Daily    traZODone (DESYREL) 50 mg, Oral        Psychotherapy:  Target symptoms: depression, anxiety   Why chosen therapy is appropriate versus another modality: relevant to diagnosis, patient responds to this modality, evidence based practice  Outcome monitoring methods: lab data  Therapeutic intervention type: insight oriented psychotherapy, supportive psychotherapy  Topics discussed/themes: work stress  The patient's response to the intervention is accepting. The patient's progress toward treatment goals is fair.   Duration of intervention: 25 minutes.    Review of Systems   Prob Pert. 1 sys, Ext. " psych +2 add., Comp. 10-14 sys  PSYCHIATRIC: Pertinant items are noted in the narrative.  CONSTITUTIONAL: No weight gain or loss.   MUSCULOSKELETAL: No pain or stiffness of the joints.  NEUROLOGIC: No weakness, sensory changes, seizures, confusion, memory loss, tremor or other abnormal movements.  ENDOCRINE: No polydipsia or polyuria.  INTEGUMENTARY: No rashes or lacerations.  EYES: No exophthalmos, jaundice or blindness.  ENT: No dizziness, tinnitus or hearing loss.  RESPIRATORY: No shortness of breath.  CARDIOVASCULAR: No tachycardia or chest pain.  GASTROINTESTINAL: No nausea, vomiting, pain, constipation or diarrhea.  GENITOURINARY: No frequency, dysuria or sexual dysfunction.  HEMATOLOGIC/LYMPHATIC: No excessive bleeding, prolonged or excessive bleeding after dental extraction/injury.  ALLERGIC/IMMUNOLOGIC: No allergic response to materials, foods or animals at this time.    Past Medical, Family and Social History: The patient's past medical, family and social history have been reviewed and updated as appropriate within the electronic medical record - see encounter notes.    Compliance: yes    Side effects: None    Risk Parameters:  Patient reports no suicidal ideation  Patient reports no homicidal ideation  Patient reports no self-injurious behavior  Patient reports no violent behavior    Exam (detailed: at least 9 elements; comprehensive: all 15 elements)   Constitutional  Vitals:  Most recent vital signs, dated less than 90 days prior to this appointment, were reviewed.   There were no vitals filed for this visit.     General:  unremarkable, age appropriate, casually dressed, neatly groomed     Musculoskeletal  Muscle Strength/Tone:  no tremor, no tic, no atrophy   Gait & Station:  non-ataxic     Psychiatric  Speech:  no latency; no press   Mood & Affect:  dysthymic  congruent and appropriate, mood-congruent   Thought Process:  normal and logical, goal-directed   Associations:  intact   Thought Content:   normal, no suicidality, no homicidality, delusions, or paranoia   Insight:  has awareness of illness   Judgement: behavior is adequate to circumstances   Orientation:  grossly intact   Memory: intact for content of interview   Language: grossly intact   Attention Span & Concentration:  able to focus   Fund of Knowledge:  intact and appropriate to age and level of education     Assessment and Diagnosis   Status/Progress: Based on the examination today, the patient's problem(s) is/are adequately but not ideally controlled.  New problems have been presented today.   Co-morbidities are complicating management of the primary condition.  There are no active rule-out diagnoses for this patient at this time.     General Impression: depressed but functional ; sobriety is inTACT       ICD-10-CM ICD-9-CM   1. Moderate recurrent major depression  F33.1 296.32   2. Alcohol use disorder, severe, in sustained remission  F10.21 303.93         Intervention/Counseling/Treatment Plan   Medication Management: Continue current medications. The risks and benefits of medication were discussed with the patient.    Discussed seizure risk - o contraindications of sz, eating disorder or brain trauma  Permission granted to s discuss her case with EM res director about recent interest       Return to Clinic: 1 month            The patient location is: [place]  The chief complaint leading to consultation is: depression     Visit type: audiovisual    Face to Face time with patient: 55 mins   60  minutes of total time spent on the encounter, which includes face to face time and non-face to face time preparing to see the patient (eg, review of tests), Obtaining and/or reviewing separately obtained history, Documenting clinical information in the electronic or other health record, Independently interpreting results (not separately reported) and communicating results to the patient/family/caregiver, or Care coordination (not separately reported).          Each patient to whom he or she provides medical services by telemedicine is:  (1) informed of the relationship between the physician and patient and the respective role of any other health care provider with respect to management of the patient; and (2) notified that he or she may decline to receive medical services by telemedicine and may withdraw from such care at any time.    Notes:

## 2025-07-25 ENCOUNTER — PATIENT MESSAGE (OUTPATIENT)
Dept: PSYCHIATRY | Facility: CLINIC | Age: 35
End: 2025-07-25
Payer: COMMERCIAL

## 2025-08-01 ENCOUNTER — OFFICE VISIT (OUTPATIENT)
Dept: PSYCHIATRY | Facility: CLINIC | Age: 35
End: 2025-08-01
Payer: COMMERCIAL

## 2025-08-01 DIAGNOSIS — F10.21 ALCOHOL USE DISORDER, SEVERE, IN SUSTAINED REMISSION: ICD-10-CM

## 2025-08-01 DIAGNOSIS — F33.1 MODERATE RECURRENT MAJOR DEPRESSION: Primary | ICD-10-CM

## 2025-08-01 PROCEDURE — 98006 SYNCH AUDIO-VIDEO EST MOD 30: CPT | Mod: 95,,, | Performed by: PSYCHIATRY & NEUROLOGY

## 2025-08-01 RX ORDER — FLUOXETINE HYDROCHLORIDE 40 MG/1
40 CAPSULE ORAL DAILY
Qty: 30 CAPSULE | Refills: 2 | Status: SHIPPED | OUTPATIENT
Start: 2025-08-01

## 2025-08-20 ENCOUNTER — OFFICE VISIT (OUTPATIENT)
Dept: PSYCHIATRY | Facility: CLINIC | Age: 35
End: 2025-08-20
Payer: COMMERCIAL

## 2025-08-20 DIAGNOSIS — F33.1 MODERATE RECURRENT MAJOR DEPRESSION: Primary | ICD-10-CM

## 2025-08-20 DIAGNOSIS — F10.21 ALCOHOL USE DISORDER, SEVERE, IN SUSTAINED REMISSION: ICD-10-CM

## 2025-08-20 PROCEDURE — 1159F MED LIST DOCD IN RCRD: CPT | Mod: CPTII,S$GLB,, | Performed by: PSYCHIATRY & NEUROLOGY

## 2025-08-20 PROCEDURE — 99213 OFFICE O/P EST LOW 20 MIN: CPT | Mod: S$GLB,,, | Performed by: PSYCHIATRY & NEUROLOGY

## 2025-08-20 PROCEDURE — 90833 PSYTX W PT W E/M 30 MIN: CPT | Mod: S$GLB,,, | Performed by: PSYCHIATRY & NEUROLOGY

## 2025-08-20 PROCEDURE — 99999 PR PBB SHADOW E&M-EST. PATIENT-LVL I: CPT | Mod: PBBFAC,,, | Performed by: PSYCHIATRY & NEUROLOGY

## 2025-08-20 PROCEDURE — G2211 COMPLEX E/M VISIT ADD ON: HCPCS | Mod: S$GLB,,, | Performed by: PSYCHIATRY & NEUROLOGY

## 2025-08-20 PROCEDURE — 1160F RVW MEDS BY RX/DR IN RCRD: CPT | Mod: CPTII,S$GLB,, | Performed by: PSYCHIATRY & NEUROLOGY

## 2025-08-20 RX ORDER — VENLAFAXINE 37.5 MG/1
37.5 TABLET ORAL DAILY
Qty: 60 TABLET | Refills: 1 | Status: SHIPPED | OUTPATIENT
Start: 2025-08-20